# Patient Record
Sex: MALE | Race: OTHER | Employment: FULL TIME | URBAN - METROPOLITAN AREA
[De-identification: names, ages, dates, MRNs, and addresses within clinical notes are randomized per-mention and may not be internally consistent; named-entity substitution may affect disease eponyms.]

---

## 2020-01-04 ENCOUNTER — HOSPITAL ENCOUNTER (EMERGENCY)
Age: 30
Discharge: HOME | End: 2020-01-04
Attending: EMERGENCY MEDICINE
Payer: COMMERCIAL

## 2020-01-04 VITALS
HEIGHT: 65 IN | BODY MASS INDEX: 30.82 KG/M2 | OXYGEN SATURATION: 96 % | DIASTOLIC BLOOD PRESSURE: 72 MMHG | RESPIRATION RATE: 16 BRPM | TEMPERATURE: 97.8 F | SYSTOLIC BLOOD PRESSURE: 142 MMHG | WEIGHT: 185 LBS | HEART RATE: 86 BPM

## 2020-01-04 LAB
CHP ED QC CHECK: YES
GLUCOSE BLD-MCNC: 80 MG/DL
GLUCOSE BLD-MCNC: 80 MG/DL (ref 75–110)

## 2020-01-04 PROCEDURE — 87491 CHLMYD TRACH DNA AMP PROBE: CPT

## 2020-01-04 PROCEDURE — 99283 EMERGENCY DEPT VISIT LOW MDM: CPT

## 2020-01-04 PROCEDURE — 82947 ASSAY GLUCOSE BLOOD QUANT: CPT

## 2020-01-04 PROCEDURE — 87591 N.GONORRHOEAE DNA AMP PROB: CPT

## 2020-01-04 NOTE — ED PROVIDER NOTES
ALLERGIES     Bee venom    FAMILY HISTORY     No family history on file. No family status information on file. None otherwise stated in nurses notes    SOCIAL HISTORY      reports that he has been smoking. He does not have any smokeless tobacco history on file. He reports current alcohol use. He reports that he does not use drugs. lives at home with others     PHYSICAL EXAM    (up to 7 for level 4, 8 or more for level 5)     ED Triage Vitals [01/04/20 1515]   BP Temp Temp src Pulse Resp SpO2 Height Weight   (!) 142/72 97.8 °F (36.6 °C) -- 86 16 96 % 5' 5\" (1.651 m) 185 lb (83.9 kg)       Physical Exam   Nursing note and vitals reviewed. Constitutional: Oriented to person, place, and time and well-developed, well-nourished. Head: Normocephalic and atraumatic. Ear: External ears normal.   Nose: Nose normal and midline. Eyes: Conjunctivae and EOM are normal. Pupils are equal, round, and reactive to light. Neck: Normal range of motion. Throat: Posterior pharynx is without erythema or exudates, airway is patent, no swelling  Cardiovascular: Normal rate, regular rhythm, normal heart sounds and intact distal pulses. Pulmonary/Chest: Effort normal and breath sounds normal. No respiratory distress. No wheezes. No rales. No chest tenderness. Musculoskeletal: Normal range of motion. Neurological: Alert and oriented to person, place, and time. GCS score is 15.    Skin: There is some slight irritation to the glans penis, there is no discharge, there is no erythema or broken skin, there is no vesicular formation  Psychiatric: Mood, memory, affect and judgment normal.           DIAGNOSTIC RESULTS     EKG: All EKG's are interpreted by the Emergency Department Physician who either signs or Co-signs this chart in the absence of a cardiologist.        RADIOLOGY:   All plain film, CT, MRI, and formal ultrasound images (except ED bedside ultrasound) are read by the radiologist and the images and

## 2020-01-06 LAB
C. TRACHOMATIS DNA ,URINE: NEGATIVE
N. GONORRHOEAE DNA, URINE: NEGATIVE
SPECIMEN DESCRIPTION: NORMAL

## 2020-01-07 ENCOUNTER — HOSPITAL ENCOUNTER (EMERGENCY)
Age: 30
Discharge: HOME | End: 2020-01-07
Attending: EMERGENCY MEDICINE
Payer: COMMERCIAL

## 2020-01-07 VITALS
OXYGEN SATURATION: 97 % | HEART RATE: 97 BPM | DIASTOLIC BLOOD PRESSURE: 86 MMHG | BODY MASS INDEX: 30.82 KG/M2 | WEIGHT: 185 LBS | HEIGHT: 65 IN | TEMPERATURE: 99.5 F | RESPIRATION RATE: 16 BRPM | SYSTOLIC BLOOD PRESSURE: 134 MMHG

## 2020-01-07 PROCEDURE — 99282 EMERGENCY DEPT VISIT SF MDM: CPT

## 2020-01-07 SDOH — HEALTH STABILITY: MENTAL HEALTH: HOW OFTEN DO YOU HAVE A DRINK CONTAINING ALCOHOL?: NEVER

## 2020-01-07 ASSESSMENT — PAIN DESCRIPTION - DESCRIPTORS: DESCRIPTORS: ACHING

## 2020-01-07 ASSESSMENT — PAIN DESCRIPTION - LOCATION: LOCATION: GENERALIZED

## 2020-01-07 ASSESSMENT — PAIN SCALES - GENERAL: PAINLEVEL_OUTOF10: 2

## 2020-01-07 ASSESSMENT — PAIN DESCRIPTION - PAIN TYPE: TYPE: ACUTE PAIN

## 2020-01-07 NOTE — LETTER
[facility] ED  [address] [address]  Phone: [phone]             January 7, 2020    Patient: Sue Chairez   YOB: 1990   Date of Visit: 1/7/2020       To Whom It May Concern:    Marilynn Landau was seen and treated in our emergency department on 1/7/2020. He may return to work on full duty on 1/10/2020.      Sincerely,             Signature:__________________________________

## 2020-01-07 NOTE — ED PROVIDER NOTES
[address] ED  eMERGENCY dEPARTMENT eNCOUnter      Pt Name: Joline Habermann  MRN: [id number]  [place] 1990  Date of evaluation: 1/7/2020  Provider: Melquiades Murray PA-C    CHIEF COMPLAINT       Chief Complaint   Patient presents with    Pharyngitis    Cough    Nasal Congestion    Generalized Body Aches           HISTORY OF PRESENT ILLNESS  (Location/Symptom, Timing/Onset, Context/Setting, Quality, Duration, Modifying Factors, Severity.)   Joline Habermann is a 30 y.o. male who presents to the emergency department with complaints of sore throat, cough, nasal congestion, postnasal drip, body aches, nausea. Symptoms started on Saturday. States he was exposed to the flu. States cough is mild. Has not checked temperature but has had cold sweats. Denies any chest pain, shortness of breath, emesis or abdominal pain. Pt states he does not like taking medications. Needs a work note. No other complaints. Nursing Notes were reviewed. REVIEW OF SYSTEMS    (2-9 systems for level 4, 10 or more for level 5)     Review of Systems   Sore throat, cough, nasal congestion, postnasal drip, body aches, nausea. Except as noted above the remainder of the review of systems was reviewed and negative. PAST MEDICAL HISTORY   History reviewed. No pertinent past medical history. None otherwise stated in nurses notes    SURGICAL HISTORY     History reviewed. No pertinent surgical history. None otherwise stated in nurses notes    CURRENT MEDICATIONS       Previous Medications    MUPIROCIN (BACTROBAN) 2 % OINTMENT    Apply topically 3 times daily. PREDNISONE (DELTASONE) 10 MG TABLET PACK    Take 4 tabs daily for 3 days,  Then 3 tabs daily for 3 days,  Then 2 tabs daily for 3 days,  Than 1 tab daily for 3 days. ALLERGIES     Bee venom    FAMILY HISTORY     History reviewed. No pertinent family history. No family status information on file.       None otherwise stated in nurses notes    SOCIAL MDM or here. No orders to display       No results found. LABS:  Labs Reviewed   RAPID INFLUENZA A/B ANTIGENS       All other labs were within normal range or not returned as of this dictation. EMERGENCY DEPARTMENT COURSE and DIFFERENTIAL DIAGNOSIS/MDM:   Vitals:    Vitals:    01/07/20 1241   BP: 134/86   Pulse: 97   Resp: 16   Temp: 99.5 °F (37.5 °C)   TempSrc: Oral   SpO2: 97%   Weight: 185 lb (83.9 kg)   Height: 5' 5\" (1.651 m)         Patient instructed to return to the emergency room if symptoms worsen, return, or any other concern right away which is agreed by the patient    ED MEDS:  No orders of the defined types were placed in this encounter. CONSULTS:  None    PROCEDURES:  None      FINAL IMPRESSION      1. Influenza-like symptoms    2. Exposure to influenza          DISPOSITION/PLAN   DISPOSITION Decision To Discharge    PATIENT REFERRED TO:  [facility] SURGERY  [facility] [address]  [address] [facility] [address]  [phone]  Call       [facility] ED  [address] [place] [address]  [address] [address]  [phone]          DISCHARGE MEDICATIONS:  New Prescriptions    No medications on file         Summation      Patient Course:    Influenza like symptoms. Exposed to the flu. Did offer influenza swab. Pt refused. States he needs a work note. Does not want any prescriptions. Discussed results and plan with the pt. They expressed appropriate understanding. Pt given close follow up, supportive care instructions and strict return instructions at the bedside. ED Medications administered this visit:  Medications - No data to display    New Prescriptions from this visit:    New Prescriptions    No medications on file       Follow-up:  [facility] [address] [phone]  Call       [facility] ED  [facility] [address]  [phone]            Final Impression:   1. Influenza-like symptoms    2.

## 2020-09-14 ENCOUNTER — APPOINTMENT (OUTPATIENT)
Dept: GENERAL RADIOLOGY | Age: 30
End: 2020-09-14
Payer: COMMERCIAL

## 2020-09-14 ENCOUNTER — HOSPITAL ENCOUNTER (EMERGENCY)
Age: 30
Discharge: HOME | End: 2020-09-14
Attending: EMERGENCY MEDICINE
Payer: COMMERCIAL

## 2020-09-14 VITALS
SYSTOLIC BLOOD PRESSURE: 147 MMHG | DIASTOLIC BLOOD PRESSURE: 84 MMHG | RESPIRATION RATE: 16 BRPM | BODY MASS INDEX: 32.95 KG/M2 | WEIGHT: 198 LBS | TEMPERATURE: 98.5 F | OXYGEN SATURATION: 99 % | HEART RATE: 103 BPM

## 2020-09-14 PROCEDURE — 72100 X-RAY EXAM L-S SPINE 2/3 VWS: CPT

## 2020-09-14 PROCEDURE — 6360000002 HC RX W HCPCS: Performed by: STUDENT IN AN ORGANIZED HEALTH CARE EDUCATION/TRAINING PROGRAM

## 2020-09-14 PROCEDURE — 99283 EMERGENCY DEPT VISIT LOW MDM: CPT

## 2020-09-14 PROCEDURE — 96372 THER/PROPH/DIAG INJ SC/IM: CPT

## 2020-09-14 PROCEDURE — 6370000000 HC RX 637 (ALT 250 FOR IP): Performed by: STUDENT IN AN ORGANIZED HEALTH CARE EDUCATION/TRAINING PROGRAM

## 2020-09-14 RX ORDER — CYCLOBENZAPRINE HCL 10 MG
10 TABLET ORAL 2 TIMES DAILY PRN
Qty: 10 TABLET | Refills: 0 | Status: SHIPPED | OUTPATIENT
Start: 2020-09-14 | End: 2020-09-24

## 2020-09-14 RX ORDER — ACETAMINOPHEN 325 MG/1
650 TABLET ORAL EVERY 6 HOURS PRN
Qty: 120 TABLET | Refills: 0 | Status: SHIPPED | OUTPATIENT
Start: 2020-09-14

## 2020-09-14 RX ORDER — KETOROLAC TROMETHAMINE 30 MG/ML
30 INJECTION, SOLUTION INTRAMUSCULAR; INTRAVENOUS ONCE
Status: COMPLETED | OUTPATIENT
Start: 2020-09-14 | End: 2020-09-14

## 2020-09-14 RX ORDER — CYCLOBENZAPRINE HCL 10 MG
10 TABLET ORAL ONCE
Status: DISCONTINUED | OUTPATIENT
Start: 2020-09-14 | End: 2020-09-14 | Stop reason: HOSPADM

## 2020-09-14 RX ORDER — ACETAMINOPHEN 325 MG/1
650 TABLET ORAL ONCE
Status: COMPLETED | OUTPATIENT
Start: 2020-09-14 | End: 2020-09-14

## 2020-09-14 RX ORDER — IBUPROFEN 600 MG/1
600 TABLET ORAL 4 TIMES DAILY PRN
Qty: 120 TABLET | Refills: 0 | Status: SHIPPED | OUTPATIENT
Start: 2020-09-14

## 2020-09-14 RX ADMIN — KETOROLAC TROMETHAMINE 30 MG: 30 INJECTION, SOLUTION INTRAMUSCULAR; INTRAVENOUS at 17:47

## 2020-09-14 RX ADMIN — ACETAMINOPHEN 650 MG: 325 TABLET ORAL at 17:43

## 2020-09-14 ASSESSMENT — PAIN SCALES - GENERAL
PAINLEVEL_OUTOF10: 8
PAINLEVEL_OUTOF10: 8
PAINLEVEL_OUTOF10: 10

## 2020-09-14 ASSESSMENT — PAIN DESCRIPTION - FREQUENCY: FREQUENCY: INTERMITTENT

## 2020-09-14 ASSESSMENT — PAIN DESCRIPTION - LOCATION: LOCATION: BACK

## 2020-09-14 ASSESSMENT — PAIN DESCRIPTION - DESCRIPTORS: DESCRIPTORS: SHOOTING;SHARP

## 2020-09-14 NOTE — ED PROVIDER NOTES
Negative for dizziness. Hematological: Does not bruise/bleed easily. Psychiatric/Behavioral: Negative for agitation. PHYSICAL EXAM   (up to 7 for level 4, 8 or more for level 5)      INITIAL VITALS:   BP (!) 147/84   Pulse 103   Temp 98.5 °F (36.9 °C) (Oral)   Resp 16   Wt 198 lb (89.8 kg)   SpO2 99%   BMI 32.95 kg/m²     Physical Exam  Constitutional:       General: He/She is not in acute distress. Appearance: Normal appearance. He/She is normal weight. He/She is not toxic-appearing. HENT:      Head: Normocephalic and atraumatic. Nose: Nose normal.      Mouth/Throat:      Mouth: Mucous membranes are moist.      Pharynx: Oropharynx is clear. Eyes:      Extraocular Movements: Extraocular movements intact. Conjunctiva/sclera: Conjunctivae normal.      Pupils: Pupils are equal, round, and reactive to light. Neck:      Musculoskeletal: Normal range of motion and neck supple. No neck rigidity. Cardiovascular:      Rate and Rhythm: Normal rate and regular rhythm. Pulses: Normal pulses. Heart sounds: Normal heart sounds. No murmur. Pulmonary:      Effort: Pulmonary effort is normal.      Breath sounds: Normal breath sounds. No wheezing. Abdominal:      General: Abdomen is flat. Bowel sounds are normal.      Tenderness: There is no abdominal tenderness. Musculoskeletal: Normal range of motion. Tenderness to palpation over midline lumbar spine     General: No swelling or tenderness. No LE edema  Skin:     General: Skin is warm. Capillary Refill: Capillary refill takes less than 2 seconds. Coloration: Skin is not jaundiced. Neurological:      General: No focal deficit present. Mental Status: He is alert and oriented to person, place, and time. Mental status is at baseline. Motor: No weakness.        DIFFERENTIAL  DIAGNOSIS     PLAN (LABS / IMAGING / EKG):  Orders Placed This Encounter   Procedures    XR LUMBAR SPINE (2-3 VIEWS)       MEDICATIONS ORDERED:  Orders Placed This Encounter   Medications    ketorolac (TORADOL) injection 30 mg    acetaminophen (TYLENOL) tablet 650 mg    cyclobenzaprine (FLEXERIL) tablet 10 mg    acetaminophen (TYLENOL) 325 MG tablet     Sig: Take 2 tablets by mouth every 6 hours as needed for Pain     Dispense:  120 tablet     Refill:  0    ibuprofen (ADVIL;MOTRIN) 600 MG tablet     Sig: Take 1 tablet by mouth 4 times daily as needed for Pain     Dispense:  120 tablet     Refill:  0    cyclobenzaprine (FLEXERIL) 10 MG tablet     Sig: Take 1 tablet by mouth 2 times daily as needed for Muscle spasms     Dispense:  10 tablet     Refill:  0           DIAGNOSTIC RESULTS / EMERGENCY DEPARTMENT COURSE / [facility]     LABS:  No results found for this visit on 09/14/20. RADIOLOGY:  Xr Lumbar Spine (2-3 Views)    Result Date: 9/14/2020  EXAMINATION: THREE XRAY VIEWS OF THE LUMBAR SPINE 9/14/2020 5:18 pm COMPARISON: None. HISTORY: ORDERING SYSTEM PROVIDED HISTORY: ttp midline acute onset back pain. TECHNOLOGIST PROVIDED HISTORY: ttp midline acute onset back pain. Reason for Exam: No known injury. lower back pain FINDINGS: Lumbar vertebral body height and alignment is maintained. Disc spaces are preserved. Negative lumbar spine radiographs. EKG  None    All EKG's are interpreted by the Emergency Department Physician who either signs or Co-signs this chart in the absence of a cardiologist.    EMERGENCY DEPARTMENT COURSE:  Patient is alert and oriented x3, breathing quietly and unlabored on room air. Speech is normal and speaking in full sentences without requiring to pause to take a breath. Patient endorses some tenderness to palpation over midline spine otherwise physical exam unremarkable. Straight leg test negative. No concern for cauda equina or friends nodularis as there is no saddle anesthesia numbness in the genital or anal region, no bowel or bladder incontinence. No history of IV drug use.   Patient overall appears well, vital signs stable, ambulatory. Will do x-rays to look at joint alignment and deformity. Treat pain with anti-inflammatories and muscle relaxer. Patient denying muscle relaxer because yesterday at home. Imaging unremarkable, pain slightly improved. Instructed the patient to follow-up with PCP for ongoing chronic pain which may require more advanced imaging. he understands and agrees. PROCEDURES:  None    CONSULTS:  None    CRITICAL CARE:  None    FINAL IMPRESSION      1.  Strain of lumbar region, initial encounter          DISPOSITION / PLAN     DISPOSITION Decision To Discharge 09/14/2020 05:48:51 PM      PATIENT REFERRED TO:  [address] [place] [address] [phone]  Schedule an appointment as soon as possible for a visit in 2 days        DISCHARGE MEDICATIONS:  Discharge Medication List as of 9/14/2020  5:56 PM      START taking these medications    Details   acetaminophen (TYLENOL) 325 MG tablet Take 2 tablets by mouth every 6 hours as needed for Pain, Disp-120 tablet,R-0Print      ibuprofen (ADVIL;MOTRIN) 600 MG tablet Take 1 tablet by mouth 4 times daily as needed for Pain, Disp-120 tablet,R-0Print      cyclobenzaprine (FLEXERIL) 10 MG tablet Take 1 tablet by mouth 2 times daily as needed for Muscle spasms, Disp-10 tablet,R-0Print             Karri Clinton MD  Emergency Medicine Resident    (Please note that portions of thisnote were completed with a voice recognition program.  Efforts were made to edit the dictations but occasionally words are mis-transcribed.)       Karri Clinton MD  Resident  09/14/20 9359

## 2022-10-10 ENCOUNTER — HOSPITAL ENCOUNTER (EMERGENCY)
Age: 32
Discharge: HOME | End: 2022-10-10
Attending: EMERGENCY MEDICINE
Payer: COMMERCIAL

## 2022-10-10 ENCOUNTER — HOSPITAL ENCOUNTER (OUTPATIENT)
Dept: VASCULAR LAB | Age: 32
Discharge: HOME | End: 2022-10-10
Payer: COMMERCIAL

## 2022-10-10 VITALS
WEIGHT: 205 LBS | HEIGHT: 65 IN | OXYGEN SATURATION: 96 % | RESPIRATION RATE: 16 BRPM | HEART RATE: 73 BPM | SYSTOLIC BLOOD PRESSURE: 129 MMHG | BODY MASS INDEX: 34.16 KG/M2 | TEMPERATURE: 98.7 F | DIASTOLIC BLOOD PRESSURE: 87 MMHG

## 2022-10-10 DIAGNOSIS — M79.662 PAIN OF LEFT LOWER LEG: ICD-10-CM

## 2022-10-10 DIAGNOSIS — I82.402 ACUTE DEEP VEIN THROMBOSIS (DVT) OF LEFT LOWER EXTREMITY, UNSPECIFIED VEIN (HCC): Primary | ICD-10-CM

## 2022-10-10 PROCEDURE — 99283 EMERGENCY DEPT VISIT LOW MDM: CPT

## 2022-10-10 PROCEDURE — 6370000000 HC RX 637 (ALT 250 FOR IP): Performed by: NURSE PRACTITIONER

## 2022-10-10 PROCEDURE — 93971 EXTREMITY STUDY: CPT

## 2022-10-10 RX ORDER — RIVAROXABAN 15 MG-20MG
KIT ORAL
Qty: 1 EACH | Refills: 0 | Status: SHIPPED | OUTPATIENT
Start: 2022-10-10

## 2022-10-10 RX ADMIN — RIVAROXABAN 15 MG: 15 TABLET, FILM COATED ORAL at 18:20

## 2022-10-10 ASSESSMENT — ENCOUNTER SYMPTOMS
COLOR CHANGE: 0
SHORTNESS OF BREATH: 0

## 2022-10-10 NOTE — ED PROVIDER NOTES
[facility] ED  EMERGENCY DEPARTMENT ENCOUNTER      Pt Name: Felipe Duggan  MRN: [id number]  [place] 1990  Date of evaluation: 10/10/2022  Provider: QUYEN Mijares CNP    CHIEF COMPLAINT       Chief Complaint   Patient presents with    Leg Pain     Left lower leg DVT. Came from dopplar         HISTORY OFPRESENT ILLNESS  (Location/Symptom, Timing/Onset, Context/Setting, Quality, Duration, Modifying Factors, Severity.)   Felipe Duggan is a 33 y.o. male who presents to the emergency department by private auto for evaluation of left lower leg pain has been gone for the past week. Patient went to an urgent care recently had outpatient ultrasound here today and was found to have acute deep vein thrombosis of the left leg involving the tibial and peroneal veins. Patient denies recent long distance travel. No personal or family history of DVT or PE. He does vape. Pain is minimal.  No chest pain or shortness of breath. Nursing Notes were reviewed. PASTMEDICAL HISTORY   History reviewed. No pertinent past medical history. SURGICAL HISTORY     History reviewed. No pertinent surgical history. CURRENT MEDICATIONS     Previous Medications    ACETAMINOPHEN (TYLENOL) 325 MG TABLET    Take 2 tablets by mouth every 6 hours as needed for Pain    IBUPROFEN (ADVIL;MOTRIN) 600 MG TABLET    Take 1 tablet by mouth 4 times daily as needed for Pain    PREDNISONE (DELTASONE) 10 MG TABLET PACK    Take 4 tabs daily for 3 days,  Then 3 tabs daily for 3 days,  Then 2 tabs daily for 3 days,  Than 1 tab daily for 3 days. ALLERGIES     Bee venom    FAMILY HISTORY     History reviewed. No pertinent family history.        SOCIAL HISTORY       Social History     Socioeconomic History    Marital status: Single     Spouse name: None    Number of children: None    Years of education: None    Highest education level: None   Tobacco Use    Smoking status: Some Days   Substance and Sexual Activity    Alcohol use: Never    Drug use: No         REVIEW OF SYSTEMS    (2-9 systems for level 4, 10 or more for level 5)     Review of Systems   Constitutional:  Negative for fever. Respiratory:  Negative for shortness of breath. Cardiovascular:  Positive for leg swelling. Negative for chest pain. Musculoskeletal:  Positive for arthralgias. Negative for gait problem. Skin:  Negative for color change. All other systems reviewed and are negative. Except as noted above the remainder of the review of systems was reviewed and negative. PHYSICAL EXAM    (up to 7 for level 4, 8 or more for level 5)     ED Triage Vitals [10/10/22 1605]   BP Temp Temp Source Heart Rate Resp SpO2 Height Weight   129/87 98.7 °F (37.1 °C) Oral 73 16 96 % 5' 5\" (1.651 m) 205 lb (93 kg)       Physical Exam  Constitutional:       Appearance: Normal appearance. He is well-developed, well-groomed and normal weight. HENT:      Head: Normocephalic. Right Ear: External ear normal.      Left Ear: External ear normal.      Nose: Nose normal.   Eyes:      Conjunctiva/sclera: Conjunctivae normal.   Cardiovascular:      Pulses:           Dorsalis pedis pulses are 2+ on the left side. Posterior tibial pulses are 2+ on the left side. Heart sounds: Normal heart sounds. Pulmonary:      Effort: Pulmonary effort is normal.      Breath sounds: Normal breath sounds. Musculoskeletal:      Right lower leg: No edema. Left lower leg: Tenderness present. 1+ Edema present. Comments: Patient has minimal swelling to the left leg. No erythema. He has mild left calf tenderness. Pedal pulses palpable. Skin:     General: Skin is warm and dry. Capillary Refill: Capillary refill takes less than 2 seconds. Findings: No erythema. Neurological:      Mental Status: He is alert and oriented to person, place, and time.          DIAGNOSTIC RESULTS     EKG:All EKG's are interpreted by the Emergency Department Physician who either signs or Co-signs this chart in the absence of a cardiologist.        RADIOLOGY:   Non-plain film images such as CT, Ultrasound and MRI are read by theradiologist. Plain radiographic images are visualized and preliminarily interpreted by the emergency physician with the below findings:    VL DUP LOWER EXTREMITY VENOUS LEFT    Result Date: 10/10/2022    [facility]  Vascular Lower Extremities DVT Study Procedure   Patient Name      Ashley Zarate       Date of Study             10/10/2022                    Stefany Mitchell   Date of Birth     1990   Gender                    Male   Age               33 year(s)   Race                      Other   Room Number       OPT   Corporate ID #    [id number]   Patient Acct #    [de-identified]   MR #              [id number]      Sonographer               Madelyn Bustillo   Accession #       [id number]    Interpreting Physician    Jessie Del Rosario   Referring Nurse                Referring Physician       ER MD *  Practitioner  Procedure Type of Study:   Veins: Lower Extremities DVT Study, Venous Scan Lower Left. Indications for Study:Pain, leg. Patient Status:Out Patient. Technical Quality:Good visualization.   - Critical Result:Criticalk results called to ER. Patient taken to ER. Marcos Love Conclusions   Summary   Acute deep vein thrombosis of the left leg involving the tibial and  peroneal veins. No evidence of superficial venous thrombosis in the left lower extremity. Signature   ----------------------------------------------------------------  Electronically signed by Madelyn Bustillo(Sonographer) on  10/10/2022 03:45 PM  ----------------------------------------------------------------   ----------------------------------------------------------------  Electronically signed by Nathanael Everett(Interpreting physician)  on 10/10/2022 04:31 PM  ----------------------------------------------------------------    Left Impression:   The left tibial veins are non compressible with no flow noted.     The common femoral, femoral, popliteal, tibials and saphenous veins are   compressible with normal doppler responses. Velocities are measured in cm/s ; Diameters are measured in cm Left Lower Extremities DVT Study Measurements Left 2D Measurements +------------------------------------+----------+---------------+----------+ ! Location                            ! Visualized! Compressibility! Thrombosis! +------------------------------------+----------+---------------+----------+ ! Common Femoral                      !Yes       ! Yes            ! None      ! +------------------------------------+----------+---------------+----------+ ! Prox Femoral                        !Yes       ! Yes            ! None      ! +------------------------------------+----------+---------------+----------+ ! Mid Femoral                         !Yes       ! Yes            ! None      ! +------------------------------------+----------+---------------+----------+ ! Dist Femoral                        !Yes       ! Yes            ! None      ! +------------------------------------+----------+---------------+----------+ ! Deep Femoral                        !Yes       ! Yes            ! None      ! +------------------------------------+----------+---------------+----------+ ! Popliteal                           !Yes       ! Yes            ! None      ! +------------------------------------+----------+---------------+----------+ ! Sapheno Femoral Junction            ! Yes       ! Yes            ! None      ! +------------------------------------+----------+---------------+----------+ ! PTV                                 ! Yes       ! No             !Acute     ! +------------------------------------+----------+---------------+----------+ ! Peroneal                            !Yes       ! No             !Acute     ! +------------------------------------+----------+---------------+----------+ ! Gastroc                             ! Yes       ! Yes            ! None      ! +------------------------------------+----------+---------------+----------+ ! GSV Thigh                           ! Yes       ! Yes            ! None      ! +------------------------------------+----------+---------------+----------+ ! GSV Knee                            ! Yes       ! Yes            ! None      ! +------------------------------------+----------+---------------+----------+ ! GSV Ankle                           ! Yes       ! Yes            ! None      ! +------------------------------------+----------+---------------+----------+ ! SSV                                 ! Yes       ! Yes            ! None      ! +------------------------------------+----------+---------------+----------+ Left Doppler Measurements +----------------------------+------+------+-------------------------------+ ! Location                    ! Signal!Reflux! Reflux (msec)                  ! +----------------------------+------+------+-------------------------------+ ! Common Femoral              !Phasic!      !                               ! +----------------------------+------+------+-------------------------------+ ! Prox Femoral                !Phasic!      !                               ! +----------------------------+------+------+-------------------------------+      Interpretation per the Radiologist below, if available at the time of this note:    VL DUP LOWER EXTREMITY VENOUS LEFT    (Results Pending)         EDBEDSIDE ULTRASOUND:   Performed by Kelly Gutierrez - none    LABS:  Labs Reviewed - No data to display    All other labs were within normal range or not returned as of this dictation. EMERGENCY DEPARTMENT COURSE andDIFFERENTIAL DIAGNOSIS/MDM:   Patient evaluated in conjunction with ER physician. Left lower extremity venous Doppler positive for DVT. Vascular consult-I spoke with Dr. Anthony Head once patient can be discharged home either on Xarelto or Eliquis can follow-up in his office in 3 months. Repeat ultrasound in about a month. I discussed diagnosis, treatment follow-up care with the patient. Pharmacy dose for first month of Xarelto provided patient. He was additionally prescribed 2 months of Xarelto which was sent electronically to [facility]no after he completes his first month of therapy. No history of kidney disease. Provided with prescription to have outpatient ultrasound about a month. All questions answered. Vitals:    Vitals:    10/10/22 1605   BP: 129/87   Pulse: 73   Resp: 16   Temp: 98.7 °F (37.1 °C)   TempSrc: Oral   SpO2: 96%   Weight: 205 lb (93 kg)   Height: 5' 5\" (1.651 m)         CONSULTS:  PHARMACY TO DOSE MEDICATION    RES:  Procedures    FINAL IMPRESSION      1. Acute deep vein thrombosis (DVT) of left lower extremity, unspecified vein [facility])          DISPOSITION/PLAN   DISPOSITION Decision To Discharge 10/10/2022 06:01:31 PM      PATIENT REFERRED TO:   Marisa Montemayor MD  [facility]  [phone]      Follow-up in 3 months. [facility] ED  [address] [facility]  [phone]    If symptoms worsen    [address] Medication Management  [address] [facility]  [phone]  Schedule an appointment as soon as possible for a visit  For follow-up with Xarelto for DVT. DISCHARGE MEDICATIONS:     New Prescriptions    RIVAROXABAN (XARELTO STARTER PACK) 15 & 20 MG STARTER PACK    Use as directed: 15mg twice daily for 21 days, then 20mg daily. Follow up with [facility].     RIVAROXABAN (XARELTO) 20 MG TABS TABLET    Take 1 tablet by mouth daily (with breakfast)     Electronically signed by QUYEN Atkins 10/10/2022 at 6:29 PM           QUYEN Atkins CNP  10/10/22 4495

## 2022-10-10 NOTE — DISCHARGE INSTRUCTIONS
Take medication as prescribed. Have a repeat ultrasound in 1 month. Follow-up with Dr. Richie Ham in 3 months. Return to emergency department if symptoms worsen. Xarelto is a blood thinning medication so if you fall and hit your head you should seek medical treatment.

## 2022-10-15 NOTE — ED PROVIDER NOTES
EMERGENCY DEPARTMENT ENCOUNTER   ATTENDING ATTESTATION     Pt Name: Loree Melendrez  MRN: [id number]  [place] 1990  Date of evaluation: 10/15/22   Loree Melendrez is a 33 y.o. male with CC: Leg Pain (Left lower leg DVT. Came from [facility])    MDM:   The patient is a 71-year-old male who presented to the emergency department secondary to recent diagnosis of DVT of the left lower leg. Patient initially on anticoagulation given outpatient vascular follow-up and parameters to return to the emergency department  This visit was performed by both a physician and an APC. I personally evaluated and examined the patient. I performed all aspects of the MDM as documented. CRITICAL CARE:       EKG: All EKG's are interpreted by the Emergency Department Physician who either signs or Co-signs this chart in the absence of a cardiologist.      RADIOLOGY:All plain film, CT, MRI, and formal ultrasound images (except ED bedside ultrasound) are read by the radiologist, see reports below, unless otherwise noted in MDM or here. VL DUP LOWER EXTREMITY VENOUS LEFT    (Results Pending)     LABS: All lab results were reviewed by myself, and all abnormals are listed below. Labs Reviewed - No data to display  CONSULTS:  PHARMACY TO DOSE MEDICATION  FINAL IMPRESSION      1. Acute deep vein thrombosis (DVT) of left lower extremity, unspecified vein (HCC)            PASTMEDICAL HISTORY   History reviewed. No pertinent past medical history. SURGICAL HISTORY     History reviewed. No pertinent surgical history.   CURRENT MEDICATIONS       Discharge Medication List as of 10/10/2022  5:54 PM        CONTINUE these medications which have NOT CHANGED    Details   acetaminophen (TYLENOL) 325 MG tablet Take 2 tablets by mouth every 6 hours as needed for Pain, Disp-120 tablet,R-0Print      ibuprofen (ADVIL;MOTRIN) 600 MG tablet Take 1 tablet by mouth 4 times daily as needed for Pain, Disp-120 tablet,R-0Print      predniSONE (DELTASONE) 10 MG tablet pack Take 4 tabs daily for 3 days,  Then 3 tabs daily for 3 days,  Then 2 tabs daily for 3 days,  Than 1 tab daily for 3 days. , Disp-30 tablet, R-0, Print           ALLERGIES     is allergic to bee venom. FAMILY HISTORY     has no family status information on file. SOCIAL HISTORY       Social History     Tobacco Use    Smoking status: Some Days   Substance Use Topics    Alcohol use: Never    Drug use: No          Dianne Jang MD  The care is provided during an unprecedented national emergency due to the novel coronavirus, COVID 19.   Attending Emergency Physician          Dianne Jang MD  72/97/23 [id number]

## 2022-10-18 ENCOUNTER — TELEPHONE (OUTPATIENT)
Dept: PHARMACY | Age: 32
End: 2022-10-18

## 2022-10-18 NOTE — TELEPHONE ENCOUNTER
Received new referral for Anticoagulation Service - [facility] ED [facility] and street  from Dr. Alexandra Ochoa. Called patient  who requested a call back in a few days and he will consider scheduling at that time .